# Patient Record
Sex: MALE | HISPANIC OR LATINO | URBAN - METROPOLITAN AREA
[De-identification: names, ages, dates, MRNs, and addresses within clinical notes are randomized per-mention and may not be internally consistent; named-entity substitution may affect disease eponyms.]

---

## 2020-03-01 ENCOUNTER — EMERGENCY (EMERGENCY)
Facility: HOSPITAL | Age: 35
LOS: 0 days | Discharge: HOME | End: 2020-03-01
Attending: EMERGENCY MEDICINE
Payer: SELF-PAY

## 2020-03-01 VITALS
RESPIRATION RATE: 18 BRPM | OXYGEN SATURATION: 99 % | DIASTOLIC BLOOD PRESSURE: 85 MMHG | TEMPERATURE: 98 F | HEART RATE: 99 BPM | SYSTOLIC BLOOD PRESSURE: 145 MMHG

## 2020-03-01 VITALS — HEIGHT: 66 IN | WEIGHT: 186.95 LBS

## 2020-03-01 DIAGNOSIS — R11.2 NAUSEA WITH VOMITING, UNSPECIFIED: ICD-10-CM

## 2020-03-01 DIAGNOSIS — R63.8 OTHER SYMPTOMS AND SIGNS CONCERNING FOOD AND FLUID INTAKE: ICD-10-CM

## 2020-03-01 DIAGNOSIS — H66.91 OTITIS MEDIA, UNSPECIFIED, RIGHT EAR: ICD-10-CM

## 2020-03-01 DIAGNOSIS — R42 DIZZINESS AND GIDDINESS: ICD-10-CM

## 2020-03-01 LAB
ALBUMIN SERPL ELPH-MCNC: 4.4 G/DL — SIGNIFICANT CHANGE UP (ref 3.3–5)
ALP SERPL-CCNC: 75 U/L — SIGNIFICANT CHANGE UP (ref 40–120)
ALT FLD-CCNC: 78 U/L — SIGNIFICANT CHANGE UP (ref 12–78)
ANION GAP SERPL CALC-SCNC: 6 MMOL/L — SIGNIFICANT CHANGE UP (ref 5–17)
AST SERPL-CCNC: 37 U/L — SIGNIFICANT CHANGE UP (ref 15–37)
BASOPHILS # BLD AUTO: 0.04 K/UL — SIGNIFICANT CHANGE UP (ref 0–0.2)
BASOPHILS NFR BLD AUTO: 0.3 % — SIGNIFICANT CHANGE UP (ref 0–2)
BILIRUB SERPL-MCNC: 0.5 MG/DL — SIGNIFICANT CHANGE UP (ref 0.2–1.2)
BUN SERPL-MCNC: 11 MG/DL — SIGNIFICANT CHANGE UP (ref 7–23)
CALCIUM SERPL-MCNC: 9.3 MG/DL — SIGNIFICANT CHANGE UP (ref 8.5–10.1)
CHLORIDE SERPL-SCNC: 104 MMOL/L — SIGNIFICANT CHANGE UP (ref 96–108)
CO2 SERPL-SCNC: 26 MMOL/L — SIGNIFICANT CHANGE UP (ref 22–31)
CREAT SERPL-MCNC: 1.1 MG/DL — SIGNIFICANT CHANGE UP (ref 0.5–1.3)
EOSINOPHIL # BLD AUTO: 0.02 K/UL — SIGNIFICANT CHANGE UP (ref 0–0.5)
EOSINOPHIL NFR BLD AUTO: 0.1 % — SIGNIFICANT CHANGE UP (ref 0–6)
GLUCOSE SERPL-MCNC: 125 MG/DL — HIGH (ref 70–99)
HCT VFR BLD CALC: 46.3 % — SIGNIFICANT CHANGE UP (ref 39–50)
HGB BLD-MCNC: 15.5 G/DL — SIGNIFICANT CHANGE UP (ref 13–17)
IMM GRANULOCYTES NFR BLD AUTO: 0.8 % — SIGNIFICANT CHANGE UP (ref 0–1.5)
LYMPHOCYTES # BLD AUTO: 18.6 % — SIGNIFICANT CHANGE UP (ref 13–44)
LYMPHOCYTES # BLD AUTO: 2.95 K/UL — SIGNIFICANT CHANGE UP (ref 1–3.3)
MCHC RBC-ENTMCNC: 29.9 PG — SIGNIFICANT CHANGE UP (ref 27–34)
MCHC RBC-ENTMCNC: 33.5 GM/DL — SIGNIFICANT CHANGE UP (ref 32–36)
MCV RBC AUTO: 89.2 FL — SIGNIFICANT CHANGE UP (ref 80–100)
MONOCYTES # BLD AUTO: 0.74 K/UL — SIGNIFICANT CHANGE UP (ref 0–0.9)
MONOCYTES NFR BLD AUTO: 4.7 % — SIGNIFICANT CHANGE UP (ref 2–14)
NEUTROPHILS # BLD AUTO: 11.95 K/UL — HIGH (ref 1.8–7.4)
NEUTROPHILS NFR BLD AUTO: 75.5 % — SIGNIFICANT CHANGE UP (ref 43–77)
PLATELET # BLD AUTO: 426 K/UL — HIGH (ref 150–400)
POTASSIUM SERPL-MCNC: 3.9 MMOL/L — SIGNIFICANT CHANGE UP (ref 3.5–5.3)
POTASSIUM SERPL-SCNC: 3.9 MMOL/L — SIGNIFICANT CHANGE UP (ref 3.5–5.3)
PROT SERPL-MCNC: 8.8 GM/DL — HIGH (ref 6–8.3)
RBC # BLD: 5.19 M/UL — SIGNIFICANT CHANGE UP (ref 4.2–5.8)
RBC # FLD: 13.1 % — SIGNIFICANT CHANGE UP (ref 10.3–14.5)
SODIUM SERPL-SCNC: 136 MMOL/L — SIGNIFICANT CHANGE UP (ref 135–145)
WBC # BLD: 15.82 K/UL — HIGH (ref 3.8–10.5)
WBC # FLD AUTO: 15.82 K/UL — HIGH (ref 3.8–10.5)

## 2020-03-01 PROCEDURE — 99284 EMERGENCY DEPT VISIT MOD MDM: CPT | Mod: 25

## 2020-03-01 PROCEDURE — 99284 EMERGENCY DEPT VISIT MOD MDM: CPT

## 2020-03-01 PROCEDURE — 80053 COMPREHEN METABOLIC PANEL: CPT

## 2020-03-01 PROCEDURE — 96374 THER/PROPH/DIAG INJ IV PUSH: CPT

## 2020-03-01 PROCEDURE — 36415 COLL VENOUS BLD VENIPUNCTURE: CPT

## 2020-03-01 PROCEDURE — 85025 COMPLETE CBC W/AUTO DIFF WBC: CPT

## 2020-03-01 RX ORDER — MECLIZINE HCL 12.5 MG
1 TABLET ORAL
Qty: 20 | Refills: 0
Start: 2020-03-01

## 2020-03-01 RX ORDER — ONDANSETRON 8 MG/1
4 TABLET, FILM COATED ORAL ONCE
Refills: 0 | Status: COMPLETED | OUTPATIENT
Start: 2020-03-01 | End: 2020-03-01

## 2020-03-01 RX ORDER — MECLIZINE HCL 12.5 MG
50 TABLET ORAL ONCE
Refills: 0 | Status: COMPLETED | OUTPATIENT
Start: 2020-03-01 | End: 2020-03-01

## 2020-03-01 RX ORDER — SODIUM CHLORIDE 9 MG/ML
1000 INJECTION INTRAMUSCULAR; INTRAVENOUS; SUBCUTANEOUS ONCE
Refills: 0 | Status: COMPLETED | OUTPATIENT
Start: 2020-03-01 | End: 2020-03-01

## 2020-03-01 RX ADMIN — ONDANSETRON 4 MILLIGRAM(S): 8 TABLET, FILM COATED ORAL at 20:39

## 2020-03-01 RX ADMIN — Medication 50 MILLIGRAM(S): at 20:39

## 2020-03-01 RX ADMIN — SODIUM CHLORIDE 1000 MILLILITER(S): 9 INJECTION INTRAMUSCULAR; INTRAVENOUS; SUBCUTANEOUS at 20:40

## 2020-03-01 NOTE — ED STATDOCS - ATTENDING CONTRIBUTION TO CARE
Attending Contribution to Care: I, Layne Ortega, performed the initial face to face bedside interview with this patient regarding history of present illness, review of symptoms and relevant past medical, social and family history.  I completed an independent physical examination.  I was the initial provider who evaluated this patient. I have signed out the follow up of any pending tests (i.e. labs, radiological studies) to the ACP.  I have communicated the patient’s plan of care and disposition with the ACP.

## 2020-03-01 NOTE — ED STATDOCS - OBJECTIVE STATEMENT
35 y/o male with no PMHx presents to the ED c/o dizziness which he suspects began yesterday, worsening today. Aggravated with head movement. +abd pain. +N/V, decreased appetite today. Pt cannot recall exact time of onset. No prior episodes. Denies diarrhea, fever, rhinorrhea, congestion, sore throat, cough. No sick contacts. No recent travel. NKDA. Nonsmoker.

## 2020-03-01 NOTE — ED STATDOCS - PROGRESS NOTE DETAILS
33 y/o M with no PMh presents with dizziness x 2 days. Reports sensation worse with head movement, room spinning sensation . +nausea/vomiting associated with symptoms. Never had symptoms like this in the past. PE: HEENT: +fluid posterior to right TM. No midline C-spine tenderness. Cardiac: s1s2, RRR. Lungs: CTAB. Abdomen: NBS x4, soft, nontender. A/P: Likely BPPV. Plan for labs, meclizine, reassess. - George Watson PA-C +leukocytosis 15K. Will also treat as otitis media with augmentin. dc home. - George Watson PA-C

## 2020-03-01 NOTE — ED ADULT NURSE NOTE - OBJECTIVE STATEMENT
Pt comes to the ED complaining of dizziness, nausea and vomiting which began yesterday. Pt states that nausea, vomiting is from the dizziness. Pt admits to drinking approx 10 beers yesterday.  Pt with no prior history.

## 2020-03-01 NOTE — ED STATDOCS - CARE PLAN
Principal Discharge DX:	Acute otitis media, unspecified otitis media type  Secondary Diagnosis:	Dizziness

## 2020-03-01 NOTE — ED STATDOCS - PATIENT PORTAL LINK FT
You can access the FollowMyHealth Patient Portal offered by [facility] by registering at the following website: [email or web address]. By joining [employer]’s FollowMyHealth portal, you will also be able to view your health information using other applications (apps) compatible with our system.

## 2025-04-30 ENCOUNTER — EMERGENCY (EMERGENCY)
Facility: HOSPITAL | Age: 40
LOS: 0 days | Discharge: HOME | End: 2025-04-30
Attending: EMERGENCY MEDICINE
Payer: SELF-PAY

## 2025-04-30 VITALS
OXYGEN SATURATION: 99 % | RESPIRATION RATE: 19 BRPM | SYSTOLIC BLOOD PRESSURE: 153 MMHG | HEART RATE: 78 BPM | WEIGHT: 198.86 LBS | TEMPERATURE: 98 F | DIASTOLIC BLOOD PRESSURE: 99 MMHG

## 2025-04-30 VITALS — WEIGHT: 192.02 LBS

## 2025-04-30 DIAGNOSIS — H60.91 UNSPECIFIED OTITIS EXTERNA, RIGHT EAR: ICD-10-CM

## 2025-04-30 DIAGNOSIS — H92.01 OTALGIA, RIGHT EAR: ICD-10-CM

## 2025-04-30 PROCEDURE — 99283 EMERGENCY DEPT VISIT LOW MDM: CPT

## 2025-04-30 RX ADMIN — Medication 2 DROP(S): at 16:44

## 2025-04-30 NOTE — ED STATDOCS - PATIENT PORTAL LINK FT
You can access the FollowMyHealth Patient Portal offered by [facility] by registering at the following website: [email or web address]. By joining [employer]’s FollowMyHealth portal, you will also be able to view your health information using other applications (apps) compatible with our system.

## 2025-04-30 NOTE — ED STATDOCS - CARE PROVIDER_API CALL
Carloz Grande  Otolaryngology  [address] [place] [id number]  Phone: [phone]  Fax: [phone]  Follow Up Time:

## 2025-04-30 NOTE — ED STATDOCS - PROGRESS NOTE DETAILS
Sidle: right ear with white discharge. +tragus ttp. no mastoid ttp. minimal canal swelling. will give drops and dc with ent fu. Discussed with patient need to return to ED if symptoms don't continue to improve or recur or develops any new or worsening symptoms that are of concern.

## 2025-04-30 NOTE — ED ADULT TRIAGE NOTE - CHIEF COMPLAINT QUOTE
Patient presents to the ER with complaints of right ear pain, feeling like ear is clogged and ringing.

## 2025-04-30 NOTE — ED STATDOCS - NSFOLLOWUPINSTRUCTIONS_ED_ALL_ED_FT
Use la gota de dexametasona 1 gota dos veces al día.    Use las gotas de cipro (3 gotas) en roger oído derecho 3 veces al día.      Otitis externa  Otitis Externa  Ear anatomy showing the outer ear canal and the eardrum. The outer ear canal is red due to swimmer's ear.  La otitis externa es helena infección del canal auditivo externo. El canal auditivo externo es la adi que está entre el exterior de la oreja y el tímpano. A la otitis externa también se la llama oído de nadador.    ¿Cuáles son las causas?  Las causas más frecuentes de esta afección incluyen las siguientes:  Nadar en agua sucia.  Humedad en el oído.  Helena lesión en el interior del oído.  Un objeto atascado en el oído.  Un nicci o rasguño en la parte exterior del oído o en el canal auditivo.  ¿Qué incrementa el riesgo?  Es más probable que presente esta afección si nada con frecuencia.    ¿Cuáles son los signos o síntomas?  En general, el primer síntoma de esta afección es la picazón en el canal auditivo. Los síntomas posteriores de la afección incluyen los siguientes:  Hinchazón del oído.  Enrojecimiento del oído.  Dolor de oído. El dolor puede empeorar cuando se jeane de la oreja.  Secreción de pus del oído.  ¿Cómo se diagnostica?  Esta afección puede diagnosticarse con un examen del oído y un análisis del líquido que sale del oído para detectar si tiene bacterias y hongos.    ¿Cómo se trata?  El tratamiento de esta afección puede incluir:  Gotas óticas con antibiótico. Generalmente se aplican benjie 10 a 14 días.  Medicamentos para reducir la picazón y la hinchazón.  Siga estas instrucciones en roger casa:  Si le recetaron gotas óticas con antibiótico, úselas bal se lo haya indicado el médico. No deje de usar el antibiótico aunque comience a sentirse mejor.  Use los medicamentos de venta soledad y los recetados solamente bal se lo haya indicado el médico.  Evite que le entre agua en los oídos, bal se lo haya indicado el médico. [facility] puede incluir no nadar ni practicar deportes de agua benjie algunos días.  Concurra a todas las visitas de seguimiento. [facility] es importante.  ¿Cómo se cornell?  Mantenga secos los oídos. Use la punta de helena toalla para secarse los oídos después de nadar o de darse un baño.  Evite rascarse o poner objetos en el oído. Estas acciones pueden dañar el canal auditivo o remover el recubrimiento de cera que lo protege y así facilitar el crecimiento de bacterias y hongos.  Evite nadar en adriana, en agua contaminada o en piscinas que pueden no tener el cloro suficiente.  Comuníquese con un médico si:  Tiene fiebre.  Roger oído continúa friend, hinchado, le duele o supura pus después de 3 días.  El dolor, la hinchazón o el enrojecimiento empeoran.  Siente un dolor de sharifa intenso.  Solicite ayuda de inmediato si:  Tiene en la adi detrás de la oreja luz, hinchada, le duele o está sensible.  Resumen  La otitis externa es helena infección del canal auditivo externo.  Las causas frecuentes son nadar en agua sucia, que quede humedad en el oído o tener un nicci o un rasguño en el oído.  Los síntomas son dolor, enrojecimiento e hinchazón del canal auditivo.  Si le recetaron gotas óticas con antibiótico, úselas bal se lo haya indicado el médico. No deje de usar el antibiótico aunque comience a sentirse mejor.  Esta información no tiene bal fin reemplazar el consejo del médico. Asegúrese de hacerle al médico cualquier pregunta que tenga.

## 2025-04-30 NOTE — ED STATDOCS - CLINICAL SUMMARY MEDICAL DECISION MAKING FREE TEXT BOX
38 y/o M presents with ear pain. Plan wax removal 40 y/o M presents with ear pain. Possible otitis externa.  Plan abx/steroid drops

## 2025-04-30 NOTE — ED STATDOCS - PHYSICAL EXAMINATION
Constitutional: NAD AAOx3  Eyes: EOMI, pupils equal  Head: Normocephalic atraumatic  Mouth: no airway obstruction  Cardiac: regular rate   Resp: Lungs CTAB  GI: Abd s/nt/nd  Neuro: CN2-12 intact  Skin: No rashes Constitutional: NAD AAOx3  Eyes: EOMI, pupils equal, left tm normal, Right TM with discahrge in ear, no pain palp mastoid area  Head: Normocephalic atraumatic  Mouth: no airway obstruction  Cardiac: regular rate   Resp: Lungs CTAB  GI: Abd s/nt/nd  Neuro: CN2-12 intact  Skin: No rashes

## 2025-04-30 NOTE — ED STATDOCS - OBJECTIVE STATEMENT
40 y/o male with no past PMHx presents to the ED c/o ear pain. Pt states he has been having ear pain for 2 days. Pt states he feels like it clogged and feels like he is hearing thing.